# Patient Record
(demographics unavailable — no encounter records)

---

## 2025-01-30 NOTE — CONSULT LETTER
[Dear  ___] : Dear  [unfilled], [Courtesy Letter:] : I had the pleasure of seeing your patient, [unfilled], in my office today. [Please see my note below.] : Please see my note below. [Consult Closing:] : Thank you very much for allowing me to participate in the care of this patient.  If you have any questions, please do not hesitate to contact me. [Sincerely,] : Sincerely, [FreeTextEntry2] : Dr. Carmela Madison  [address] [place] [address]  [phone] [FreeTextEntry3] : Kirstie Diez MD Pediatric Otolaryngology / Head and Neck Surgery    [facility] [address] [place] [address] Tel [phone] Fax [phone]    [facility] [address] [place] [address]  Tel [phone] Fax [phone]

## 2025-01-30 NOTE — HISTORY OF PRESENT ILLNESS
[de-identified] : Today I had the pleasure of seeing JANAY for post op evaluation.      JANAY is now s/p Tonsillectomy, adenoidectomy, bilateral ablation of inferior turbinates 12/23/24 [de-identified] : snoring resolved, doing well, congestion resolved, more awake during the day one episode of vomiting a single blood clot 2-3 days after surgery

## 2025-01-30 NOTE — ASSESSMENT
[FreeTextEntry1] : JANAY is a 6 year old boy presenting for sleep disordered breathing s/p Tonsillectomy, adenoidectomy, bilateral ablation of inferior turbinates 12/223/24.  doing well follow up as needed

## 2025-01-30 NOTE — PHYSICAL EXAM
[Partial] : partial cerumen impaction [Surgically Absent] : surgically absent [Increased Work of Breathing] : no increased work of breathing with use of accessory muscles and retractions [Normal Gait and Station] : normal gait and station [Normal muscle strength, symmetry and tone of facial, head and neck musculature] : normal muscle strength, symmetry and tone of facial, head and neck musculature [Normal] : no cervical lymphadenopathy [de-identified] : decongested  [de-identified] : decongested

## 2025-01-30 NOTE — REASON FOR VISIT
[Post-Operative Visit] : a post-operative visit for [Sleep Apnea/ Snoring] : sleep apnea/ snoring [Mother] : mother

## 2025-07-30 NOTE — HISTORY OF PRESENT ILLNESS
[Mother] : mother [Grade ___] : Grade [unfilled] [No difficulties with Homework] : No difficulties with homework [Adequate performance] : Adequate performance [Adequate attention] : Adequate attention [Brushing teeth] : Brushing teeth [Yes] : Patient goes to dentist yearly [Fruit] : fruit [Vegetables] : vegetables [Meat] : meat [Grains] : grains [Eggs] : eggs [Dairy] : dairy [Toilet Trained] : toilet trained [Normal] : Normal [In own bed] : In own bed [Up to date] : Up to date [FreeTextEntry7] : S/P T&A LAST DEC [FreeTextEntry8] : PULL UPS AT NIGHT,  HAS NEVER BEEN DRY.  SISTER WET BED UNTIL AGE 7 [de-identified] : CAVITIES BEING TREATED BY DENTIST [de-identified] : [facility], LIKES MATH

## 2025-07-30 NOTE — PHYSICAL EXAM
[Alert] : alert [No Acute Distress] : no acute distress [Normocephalic] : normocephalic [Conjunctivae with no discharge] : conjunctivae with no discharge [PERRL] : PERRL [EOMI Bilateral] : EOMI bilateral [Auricles Well Formed] : auricles well formed [Clear Tympanic membranes with present light reflex and bony landmarks] : clear tympanic membranes with present light reflex and bony landmarks [No Discharge] : no discharge [Nares Patent] : nares patent [Pink Nasal Mucosa] : pink nasal mucosa [Palate Intact] : palate intact [Nonerythematous Oropharynx] : nonerythematous oropharynx [Supple, full passive range of motion] : supple, full passive range of motion [No Palpable Masses] : no palpable masses [Symmetric Chest Rise] : symmetric chest rise [Clear to Auscultation Bilaterally] : clear to auscultation bilaterally [Regular Rate and Rhythm] : regular rate and rhythm [Normal S1, S2 present] : normal S1, S2 present [No Murmurs] : no murmurs [+2 Femoral Pulses] : +2 femoral pulses [Soft] : soft [NonTender] : non tender [Non Distended] : non distended [Normoactive Bowel Sounds] : normoactive bowel sounds [No Hepatomegaly] : no hepatomegaly [No Splenomegaly] : no splenomegaly [Adalberto: _____] : Adalberto [unfilled] [Testicles Descended Bilaterally] : testicles descended bilaterally [No Abnormal Lymph Nodes Palpated] : no abnormal lymph nodes palpated [No Gait Asymmetry] : no gait asymmetry [No pain or deformities with palpation of bone, muscles, joints] : no pain or deformities with palpation of bone, muscles, joints [Normal Muscle Tone] : normal muscle tone [Straight] : straight [No Rash or Lesions] : no rash or lesions

## 2025-07-30 NOTE — DEVELOPMENTAL MILESTONES
[Normal Development] : Normal Development [Rides a standard bike] : rides a standard bike [Chooses preferred foods] : chooses preferred foods [Tells a story with a beginning,] : tells a story with a beginning, a middle, and an end [Is dry day and night] : is not dry day and night

## 2025-07-30 NOTE — HISTORY OF PRESENT ILLNESS
[Mother] : mother [Grade ___] : Grade [unfilled] [No difficulties with Homework] : No difficulties with homework [Adequate performance] : Adequate performance [Adequate attention] : Adequate attention [Brushing teeth] : Brushing teeth [Yes] : Patient goes to dentist yearly [Fruit] : fruit [Vegetables] : vegetables [Meat] : meat [Grains] : grains [Eggs] : eggs [Dairy] : dairy [Toilet Trained] : toilet trained [Normal] : Normal [In own bed] : In own bed [Up to date] : Up to date [FreeTextEntry7] : S/P T&A LAST DEC [FreeTextEntry8] : PULL UPS AT NIGHT,  HAS NEVER BEEN DRY.  SISTER WET BED UNTIL AGE 7 [de-identified] : CAVITIES BEING TREATED BY DENTIST [de-identified] : [facility], LIKES MATH

## 2025-07-30 NOTE — DISCUSSION/SUMMARY
[Normal Growth] : growth [Normal Development] : development  [No Elimination Concerns] : elimination [Continue Regimen] : feeding [No Skin Concerns] : skin [Normal Sleep Pattern] : sleep [None] : no medical problems [School Readiness] : school readiness [Mental Health] : mental health [Nutrition and Physical Activity] : nutrition and physical activity [Oral Health] : oral health [Safety] : safety [Anticipatory Guidance Given] : Anticipatory guidance addressed as per the history of present illness section [No Medications] : ~He/She~ is not on any medications